# Patient Record
Sex: FEMALE
[De-identification: names, ages, dates, MRNs, and addresses within clinical notes are randomized per-mention and may not be internally consistent; named-entity substitution may affect disease eponyms.]

---

## 2020-12-01 PROBLEM — Z00.00 ENCOUNTER FOR PREVENTIVE HEALTH EXAMINATION: Status: ACTIVE | Noted: 2020-12-01

## 2020-12-02 ENCOUNTER — APPOINTMENT (OUTPATIENT)
Dept: ORTHOPEDIC SURGERY | Facility: CLINIC | Age: 65
End: 2020-12-02
Payer: MEDICARE

## 2020-12-02 VITALS
HEIGHT: 63 IN | SYSTOLIC BLOOD PRESSURE: 149 MMHG | WEIGHT: 146 LBS | BODY MASS INDEX: 25.87 KG/M2 | HEART RATE: 53 BPM | DIASTOLIC BLOOD PRESSURE: 84 MMHG

## 2020-12-02 DIAGNOSIS — T84.84XA PAIN DUE TO INTERNAL ORTHOPEDIC PROSTHETIC DEVICES, IMPLANTS AND GRAFTS, INITIAL ENCOUNTER: ICD-10-CM

## 2020-12-02 DIAGNOSIS — Z96.653 PAIN DUE TO INTERNAL ORTHOPEDIC PROSTHETIC DEVICES, IMPLANTS AND GRAFTS, INITIAL ENCOUNTER: ICD-10-CM

## 2020-12-02 DIAGNOSIS — M25.562 PAIN IN RIGHT KNEE: ICD-10-CM

## 2020-12-02 DIAGNOSIS — M25.561 PAIN IN RIGHT KNEE: ICD-10-CM

## 2020-12-02 PROCEDURE — 99204 OFFICE O/P NEW MOD 45 MIN: CPT

## 2020-12-02 PROCEDURE — 73564 X-RAY EXAM KNEE 4 OR MORE: CPT | Mod: 50

## 2020-12-02 NOTE — PHYSICAL EXAM
[de-identified] : Patient is well nourished, well-developed, in no acute distress, with appropriate mood and affect. The patient is oriented to time, place, and person. Respirations are even and unlabored. Gait evaluation does reveal a limp. There is no inguinal adenopathy. Bilateral limbs are well-perfused, without skin lesions, shows a grossly normal motor and sensory examination. The right knee motion is painless and the right knee moves from -10 to 125 degrees. The knee is stable within that range-of-motion to AP and ML stress. This patient has bilateral knees although the needle does open to 10 degrees varus valgus stress and pistons with a 2+ drawer.The alignment of the knee is 5 degrees varus. Muscle strength is normal. Pedal pulses are palpable. Hip examination was negative. The left knee motion is painless and the left knee moves from 0 to 125 degrees. The knee is stable within that range-of-motion to AP and ML stress. The alignment of the knee is 5 degrees varus. Muscle strength is normal. Pedal pulses are palpable. Hip examination was negative. [de-identified] : AP, lateral, sunrise knee x-rays of the bilateral knee were ordered and obtained in the office and demonstrate satisfactory position and alignment of the components are present. No signs of loosening are seen.

## 2020-12-02 NOTE — HISTORY OF PRESENT ILLNESS
[de-identified] : This is very nice 65-year-old female experiencing mild bilateral knee pain.  She underwent bilateral total knee arthroplasties with the left knee done in 2012 and ended up with a right total knee arthroplasty on 2013 and ending up was.  Very well from the surgeries.  No complications.  Over the last 2 to 3 years she has been having increasing swelling of the right knee intermittently.  This does not limit her activities daily living skills to lock up on stairs.  Now severe lately over the last 1 to 2 months has developed some mild swelling of the left knee 2.  No fevers or chills.  Walking tolerance not limited.  Comes daily living and not limited.  She has not require a cane or walker for ambulation.  She is not taking any NSAIDs.

## 2020-12-02 NOTE — DISCUSSION/SUMMARY
[de-identified] : This patient is bilateral total knee arthroplasties that are functioning well however the right knee does appear to be slightly loosened with extension and flexion which may represent instability.  Left total knee arthroplasty.  Be functioning completely well.  The patient is not an appropriate candidate for surgical intervention at this time. An extensive discussion was conducted on the natural history of the disease and the variety of surgical and non-surgical options available to the patient including, but not limited to non-steroidal anti-inflammatory medications, steroid injections, physical therapy, maintenance of ideal body weight, and reduction of activity.  The patient will try a range of motion knee brace with side hinges which I prescribed today to the patient to see if this stiffens up her knee and avoid the instability episodes.  Follow-up as recommended in 1 year. \par

## 2021-03-15 ENCOUNTER — TRANSCRIPTION ENCOUNTER (OUTPATIENT)
Age: 66
End: 2021-03-15

## 2024-02-22 ENCOUNTER — NEW PATIENT (OUTPATIENT)
Dept: URBAN - METROPOLITAN AREA CLINIC 32 | Facility: CLINIC | Age: 69
End: 2024-02-22

## 2024-02-22 DIAGNOSIS — H25.813: ICD-10-CM

## 2024-02-22 DIAGNOSIS — H43.393: ICD-10-CM

## 2024-02-22 DIAGNOSIS — H35.033: ICD-10-CM

## 2024-02-22 PROCEDURE — 92202 OPSCPY EXTND ON/MAC DRAW: CPT

## 2024-02-22 PROCEDURE — 92134 CPTRZ OPH DX IMG PST SGM RTA: CPT

## 2024-02-22 PROCEDURE — 99204 OFFICE O/P NEW MOD 45 MIN: CPT

## 2024-02-22 ASSESSMENT — VISUAL ACUITY
OS_PH: 20/25-3
OD_CC: 20/30-2
OS_CC: 20/40-3
OS_AM: 20/20
OD_PAM: 20/20-2

## 2024-02-22 ASSESSMENT — TONOMETRY
OD_IOP_MMHG: 19
OS_IOP_MMHG: 20
OD_IOP_MMHG: 21